# Patient Record
Sex: FEMALE | Race: BLACK OR AFRICAN AMERICAN | NOT HISPANIC OR LATINO | Employment: FULL TIME | URBAN - METROPOLITAN AREA
[De-identification: names, ages, dates, MRNs, and addresses within clinical notes are randomized per-mention and may not be internally consistent; named-entity substitution may affect disease eponyms.]

---

## 2022-01-17 ENCOUNTER — APPOINTMENT (OUTPATIENT)
Dept: CT IMAGING | Facility: HOSPITAL | Age: 22
End: 2022-01-17

## 2022-01-17 ENCOUNTER — HOSPITAL ENCOUNTER (EMERGENCY)
Facility: HOSPITAL | Age: 22
Discharge: HOME | End: 2022-01-17
Attending: EMERGENCY MEDICINE | Admitting: EMERGENCY MEDICINE

## 2022-01-17 VITALS
WEIGHT: 116.9 LBS | RESPIRATION RATE: 17 BRPM | TEMPERATURE: 97.5 F | DIASTOLIC BLOOD PRESSURE: 76 MMHG | BODY MASS INDEX: 19.47 KG/M2 | HEIGHT: 65 IN | SYSTOLIC BLOOD PRESSURE: 123 MMHG | OXYGEN SATURATION: 100 % | HEART RATE: 81 BPM

## 2022-01-17 DIAGNOSIS — R17 ELEVATED BILIRUBIN: ICD-10-CM

## 2022-01-17 DIAGNOSIS — R10.13 EPIGASTRIC ABDOMINAL PAIN: Primary | ICD-10-CM

## 2022-01-17 LAB
ALBUMIN SERPL-MCNC: 4.9 G/DL (ref 3.5–5.2)
ALBUMIN/GLOB SERPL: 1.9 G/DL
ALP SERPL-CCNC: 46 U/L (ref 39–117)
ALT SERPL W P-5'-P-CCNC: 19 U/L (ref 1–33)
ANION GAP SERPL CALCULATED.3IONS-SCNC: 15 MMOL/L (ref 5–15)
AST SERPL-CCNC: 16 U/L (ref 1–32)
BACTERIA UR QL AUTO: ABNORMAL /HPF
BASOPHILS # BLD AUTO: 0.04 10*3/MM3 (ref 0–0.2)
BASOPHILS NFR BLD AUTO: 0.9 % (ref 0–1.5)
BILIRUB SERPL-MCNC: 2.2 MG/DL (ref 0–1.2)
BILIRUB UR QL STRIP: NEGATIVE
BUN SERPL-MCNC: 9 MG/DL (ref 6–20)
BUN/CREAT SERPL: 9 (ref 7–25)
CALCIUM SPEC-SCNC: 9.7 MG/DL (ref 8.6–10.5)
CHLORIDE SERPL-SCNC: 103 MMOL/L (ref 98–107)
CLARITY UR: ABNORMAL
CO2 SERPL-SCNC: 21 MMOL/L (ref 22–29)
COLOR UR: YELLOW
CREAT SERPL-MCNC: 1 MG/DL (ref 0.57–1)
DEPRECATED RDW RBC AUTO: 39.4 FL (ref 37–54)
EOSINOPHIL # BLD AUTO: 0.04 10*3/MM3 (ref 0–0.4)
EOSINOPHIL NFR BLD AUTO: 0.9 % (ref 0.3–6.2)
ERYTHROCYTE [DISTWIDTH] IN BLOOD BY AUTOMATED COUNT: 13.2 % (ref 12.3–15.4)
GFR SERPL CREATININE-BSD FRML MDRD: 84 ML/MIN/1.73
GLOBULIN UR ELPH-MCNC: 2.6 GM/DL
GLUCOSE SERPL-MCNC: 81 MG/DL (ref 65–99)
GLUCOSE UR STRIP-MCNC: NEGATIVE MG/DL
HCG SERPL QL: NEGATIVE
HCT VFR BLD AUTO: 37.5 % (ref 34–46.6)
HGB BLD-MCNC: 12.4 G/DL (ref 12–15.9)
HGB UR QL STRIP.AUTO: ABNORMAL
HYALINE CASTS UR QL AUTO: ABNORMAL /LPF
IMM GRANULOCYTES # BLD AUTO: 0.01 10*3/MM3 (ref 0–0.05)
IMM GRANULOCYTES NFR BLD AUTO: 0.2 % (ref 0–0.5)
KETONES UR QL STRIP: ABNORMAL
LEUKOCYTE ESTERASE UR QL STRIP.AUTO: NEGATIVE
LIPASE SERPL-CCNC: 15 U/L (ref 13–60)
LYMPHOCYTES # BLD AUTO: 1.75 10*3/MM3 (ref 0.7–3.1)
LYMPHOCYTES NFR BLD AUTO: 39.5 % (ref 19.6–45.3)
MCH RBC QN AUTO: 27.4 PG (ref 26.6–33)
MCHC RBC AUTO-ENTMCNC: 33.1 G/DL (ref 31.5–35.7)
MCV RBC AUTO: 83 FL (ref 79–97)
MONOCYTES # BLD AUTO: 0.45 10*3/MM3 (ref 0.1–0.9)
MONOCYTES NFR BLD AUTO: 10.2 % (ref 5–12)
MUCOUS THREADS URNS QL MICRO: ABNORMAL /HPF
NEUTROPHILS NFR BLD AUTO: 2.14 10*3/MM3 (ref 1.7–7)
NEUTROPHILS NFR BLD AUTO: 48.3 % (ref 42.7–76)
NITRITE UR QL STRIP: NEGATIVE
NRBC BLD AUTO-RTO: 0 /100 WBC (ref 0–0.2)
PH UR STRIP.AUTO: 5.5 [PH] (ref 5–8)
PLATELET # BLD AUTO: 311 10*3/MM3 (ref 140–450)
PMV BLD AUTO: 10.6 FL (ref 6–12)
POTASSIUM SERPL-SCNC: 3.6 MMOL/L (ref 3.5–5.2)
PROT SERPL-MCNC: 7.5 G/DL (ref 6–8.5)
PROT UR QL STRIP: NEGATIVE
RBC # BLD AUTO: 4.52 10*6/MM3 (ref 3.77–5.28)
RBC # UR STRIP: ABNORMAL /HPF
REF LAB TEST METHOD: ABNORMAL
SODIUM SERPL-SCNC: 139 MMOL/L (ref 136–145)
SP GR UR STRIP: 1.02 (ref 1–1.03)
SQUAMOUS #/AREA URNS HPF: ABNORMAL /HPF
UROBILINOGEN UR QL STRIP: ABNORMAL
WBC # UR STRIP: ABNORMAL /HPF
WBC NRBC COR # BLD: 4.43 10*3/MM3 (ref 3.4–10.8)

## 2022-01-17 PROCEDURE — 83690 ASSAY OF LIPASE: CPT | Performed by: PHYSICIAN ASSISTANT

## 2022-01-17 PROCEDURE — 99283 EMERGENCY DEPT VISIT LOW MDM: CPT

## 2022-01-17 PROCEDURE — 80053 COMPREHEN METABOLIC PANEL: CPT | Performed by: PHYSICIAN ASSISTANT

## 2022-01-17 PROCEDURE — 84703 CHORIONIC GONADOTROPIN ASSAY: CPT | Performed by: PHYSICIAN ASSISTANT

## 2022-01-17 PROCEDURE — 81001 URINALYSIS AUTO W/SCOPE: CPT | Performed by: PHYSICIAN ASSISTANT

## 2022-01-17 PROCEDURE — 96374 THER/PROPH/DIAG INJ IV PUSH: CPT

## 2022-01-17 PROCEDURE — 85025 COMPLETE CBC W/AUTO DIFF WBC: CPT | Performed by: PHYSICIAN ASSISTANT

## 2022-01-17 PROCEDURE — 25010000002 IOPAMIDOL 61 % SOLUTION: Performed by: EMERGENCY MEDICINE

## 2022-01-17 PROCEDURE — 74177 CT ABD & PELVIS W/CONTRAST: CPT

## 2022-01-17 RX ORDER — SUCRALFATE 1 G/1
1 TABLET ORAL 4 TIMES DAILY
Qty: 20 TABLET | Refills: 0 | Status: ON HOLD | OUTPATIENT
Start: 2022-01-17 | End: 2022-05-13

## 2022-01-17 RX ORDER — FAMOTIDINE 10 MG/ML
20 INJECTION, SOLUTION INTRAVENOUS ONCE
Status: COMPLETED | OUTPATIENT
Start: 2022-01-17 | End: 2022-01-17

## 2022-01-17 RX ORDER — LIDOCAINE HYDROCHLORIDE 20 MG/ML
15 SOLUTION OROPHARYNGEAL ONCE
Status: COMPLETED | OUTPATIENT
Start: 2022-01-17 | End: 2022-01-17

## 2022-01-17 RX ORDER — SODIUM CHLORIDE 0.9 % (FLUSH) 0.9 %
10 SYRINGE (ML) INJECTION AS NEEDED
Status: DISCONTINUED | OUTPATIENT
Start: 2022-01-17 | End: 2022-01-17 | Stop reason: HOSPADM

## 2022-01-17 RX ORDER — ALUMINA, MAGNESIA, AND SIMETHICONE 2400; 2400; 240 MG/30ML; MG/30ML; MG/30ML
15 SUSPENSION ORAL ONCE
Status: COMPLETED | OUTPATIENT
Start: 2022-01-17 | End: 2022-01-17

## 2022-01-17 RX ADMIN — IOPAMIDOL 85 ML: 612 INJECTION, SOLUTION INTRAVENOUS at 12:26

## 2022-01-17 RX ADMIN — MAGNESIUM HYDROXIDE,ALUMINUM HYDROXICE,SIMETHICONE 15 ML: 240; 2400; 2400 SUSPENSION ORAL at 13:02

## 2022-01-17 RX ADMIN — FAMOTIDINE 20 MG: 10 INJECTION INTRAVENOUS at 11:37

## 2022-01-17 RX ADMIN — LIDOCAINE HYDROCHLORIDE 15 ML: 20 SOLUTION ORAL; TOPICAL at 13:02

## 2022-01-17 NOTE — ED PROVIDER NOTES
MD ATTESTATION NOTE    The VÍCTRO and I have discussed this patient's history, physical exam, and treatment plan.  I have reviewed the documentation and personally had a face to face interaction with the patient. I affirm the documentation and agree with the treatment and plan.  The attached note describes my personal findings.      I provided a substantive portion of the care of the patient.  I personally performed the physical exam in its entirety, and below are my findings.  For this patient encounter, the patient wore surgical mask, I wore full protective PPE including N95 and eye protection    General : Patient is awake alert and oriented  HEENT: NCAT  CV: Heart is regular with no murmurs  Respiratory: CTA bilaterally  Abd: Soft, mild tenderness in the left upper quadrant with no rebound or guarding.  It is nondistended.  Ext: No acute abnormalities  Skin: No rash  Neuro: Cranial nerves II through XII grossly intact as tested.  No acute lateralizing deficits.  Psych: Normal mood and affect    Patient presents with left upper quadrant abdominal pain this been ongoing now for quite a while.  It definitely gets worse whenever she eats, and sounds like acid reflux or gastritis.  Her vitals are stable, labs are unremarkable, CT scan shows nothing acute.  I think she should continue with the acid reducing medications, really work on a bland diet and other reflux precautions, and we will give her a GI follow-up to call for an appointment.         Rich Juarez MD  01/17/22 6656

## 2022-01-17 NOTE — ED NOTES
Pt wearing mask throughout encouter. This RN wearing mask and goggles throughout encounter.       Florencia Monahan, RN  01/17/22 1058

## 2022-01-17 NOTE — DISCHARGE INSTRUCTIONS
Although you are being discharged from the ED today, I encourage you to return for worsening symptoms. Things can, and do, change such that treatment at home with medication may not be adequate. Specifically I recommend returning for severe or intractable abdominal pain, intractable vomiting, high fever or any other worsening or alarming symptoms.     Rest.  Eat a bland diet.  Follow up with gastroenterology for further evaluation and management.  Call to make an appointment soon as possible.  Your bilirubin (liver test) was mildly elevated today.  Your GI doctor or PCP can follow-up with this.  Follow up with primary care provider for further management and to have blood pressure rechecked.  Take your medications as prescribed.

## 2022-01-17 NOTE — ED NOTES
Patient states she has had left sided abdominal pain for 4-5 months. Patient was recently dx with acid reflux at [facility] and given medication without any relief.          Isha Dave RN  01/17/22 1015

## 2022-01-17 NOTE — ED PROVIDER NOTES
EMERGENCY DEPARTMENT ENCOUNTER    Room Number:  02/02  Date seen:  1/17/2022  Time seen: 11:14 EST  PCP: Provider, No Known  Historian: patient      HPI:  Chief Complaint: abdominal pain    Context: Sandy Davidson is a 21 y.o. female who presents to the ED for evaluation of abdominal pain.  She states she has had epigastric abdominal pain since October of last year.  She states she went to an urgent care in November and was told it was likely acid reflux and was placed on omeprazole which the patient has taken with little to no relief.  She states over the past week the pain has become severe in nature.  She states it becomes much worse anytime after she eats.  She says she has had little to no appetite over the past couple months and has lost approximately 30 pounds.  She denies any fever, chills, vomiting, diarrhea.  She states she has had nausea at times.  She has not had any abdominal surgeries.  She states she is sexually active and uses condoms and oral contraceptives and does not believe she could be pregnant at this time.  She has never seen a GI doctor or had an endoscopy or upper GI.  She denies any black or bloody stools.  She denies any urinary symptoms or flank pain.  She states the pain is in the epigastric region and sometimes radiates to the left side of her abdomen.      PAST MEDICAL HISTORY  Active Ambulatory Problems     Diagnosis Date Noted   • No Active Ambulatory Problems     Resolved Ambulatory Problems     Diagnosis Date Noted   • No Resolved Ambulatory Problems     Past Medical History:   Diagnosis Date   • ADHD    • Anemia    • GERD (gastroesophageal reflux disease)    • Hyperlipidemia          PAST SURGICAL HISTORY  History reviewed. No pertinent surgical history.      FAMILY HISTORY  History reviewed. No pertinent family history.      SOCIAL HISTORY  Social History     Socioeconomic History   • Marital status: Single   Tobacco Use   • Smoking status: Former Smoker   • Smokeless tobacco:  Never Used   Substance and Sexual Activity   • Alcohol use: Yes     Comment: social    • Drug use: Never         ALLERGIES  Patient has no known allergies.        REVIEW OF SYSTEMS  Review of Systems   Constitutional: Negative for chills and fever.   HENT: Negative for sore throat.    Respiratory: Negative for cough and shortness of breath.    Cardiovascular: Negative for chest pain.   Gastrointestinal: Positive for abdominal pain and nausea. Negative for diarrhea and vomiting.   Genitourinary: Negative for dysuria, flank pain and vaginal discharge.   Musculoskeletal: Negative for back pain.   Neurological: Negative for light-headedness.   All other systems reviewed and are negative.     All systems reviewed and negative except for those discussed in HPI.       PHYSICAL EXAM  ED Triage Vitals   Temp Heart Rate Resp BP SpO2   01/17/22 1013 01/17/22 1013 01/17/22 1013 01/17/22 1038 01/17/22 1013   97.5 °F (36.4 °C) 119 18 125/84 95 %      Temp src Heart Rate Source Patient Position BP Location FiO2 (%)   -- -- -- -- --                GENERAL: not distressed  HENT: atraumatic  EYES: no scleral icterus  CV: regular rhythm, regular rate  RESPIRATORY: normal effort  ABDOMEN: soft, tender in the epigastric and left upper quadrant with mild voluntary guarding.  No palpable masses.  Normal bowel sounds.  No distention.  No CVA tenderness bilaterally.  MUSCULOSKELETAL: no deformity  NEURO: alert, moves all extremities, follows commands  SKIN: warm, dry    Vital signs and nursing notes reviewed.          LAB RESULTS  Recent Results (from the past 24 hour(s))   Urinalysis With Microscopic If Indicated (No Culture) - Urine, Clean Catch    Collection Time: 01/17/22 10:40 AM    Specimen: Urine, Clean Catch   Result Value Ref Range    Color, UA Yellow Yellow, Straw    Appearance, UA Slightly Cloudy (A) Clear    pH, UA 5.5 5.0 - 8.0    Specific Gravity, UA 1.025 1.005 - 1.030    Glucose, UA Negative Negative    Ketones, UA 15 mg/dL  (1+) (A) Negative    Bilirubin, UA Negative Negative    Blood, UA Moderate (2+) (A) Negative    Protein, UA Negative Negative    Leuk Esterase, UA Negative Negative    Nitrite, UA Negative Negative    Urobilinogen, UA 0.2 E.U./dL 0.2 - 1.0 E.U./dL   Urinalysis, Microscopic Only - Urine, Clean Catch    Collection Time: 01/17/22 10:40 AM    Specimen: Urine, Clean Catch   Result Value Ref Range    RBC, UA 0-2 None Seen, 0-2 /HPF    WBC, UA 6-12 (A) None Seen, 0-2 /HPF    Bacteria, UA 1+ (A) None Seen /HPF    Squamous Epithelial Cells, UA 7-12 (A) None Seen, 0-2 /HPF    Hyaline Casts, UA 0-2 None Seen /LPF    Mucus, UA Trace None Seen, Trace /HPF    Methodology Manual Light Microscopy    Comprehensive Metabolic Panel    Collection Time: 01/17/22 10:45 AM    Specimen: Blood   Result Value Ref Range    Glucose 81 65 - 99 mg/dL    BUN 9 6 - 20 mg/dL    Creatinine 1.00 0.57 - 1.00 mg/dL    Sodium 139 136 - 145 mmol/L    Potassium 3.6 3.5 - 5.2 mmol/L    Chloride 103 98 - 107 mmol/L    CO2 21.0 (L) 22.0 - 29.0 mmol/L    Calcium 9.7 8.6 - 10.5 mg/dL    Total Protein 7.5 6.0 - 8.5 g/dL    Albumin 4.90 3.50 - 5.20 g/dL    ALT (SGPT) 19 1 - 33 U/L    AST (SGOT) 16 1 - 32 U/L    Alkaline Phosphatase 46 39 - 117 U/L    Total Bilirubin 2.2 (H) 0.0 - 1.2 mg/dL    eGFR  African Amer 84 >60 mL/min/1.73    Globulin 2.6 gm/dL    A/G Ratio 1.9 g/dL    BUN/Creatinine Ratio 9.0 7.0 - 25.0    Anion Gap 15.0 5.0 - 15.0 mmol/L   hCG, Serum, Qualitative    Collection Time: 01/17/22 10:45 AM    Specimen: Blood   Result Value Ref Range    HCG Qualitative Negative Negative   CBC Auto Differential    Collection Time: 01/17/22 10:45 AM    Specimen: Blood   Result Value Ref Range    WBC 4.43 3.40 - 10.80 10*3/mm3    RBC 4.52 3.77 - 5.28 10*6/mm3    Hemoglobin 12.4 12.0 - 15.9 g/dL    Hematocrit 37.5 34.0 - 46.6 %    MCV 83.0 79.0 - 97.0 fL    MCH 27.4 26.6 - 33.0 pg    MCHC 33.1 31.5 - 35.7 g/dL    RDW 13.2 12.3 - 15.4 %    RDW-SD 39.4 37.0 - 54.0  fl    MPV 10.6 6.0 - 12.0 fL    Platelets 311 140 - 450 10*3/mm3    Neutrophil % 48.3 42.7 - 76.0 %    Lymphocyte % 39.5 19.6 - 45.3 %    Monocyte % 10.2 5.0 - 12.0 %    Eosinophil % 0.9 0.3 - 6.2 %    Basophil % 0.9 0.0 - 1.5 %    Immature Grans % 0.2 0.0 - 0.5 %    Neutrophils, Absolute 2.14 1.70 - 7.00 10*3/mm3    Lymphocytes, Absolute 1.75 0.70 - 3.10 10*3/mm3    Monocytes, Absolute 0.45 0.10 - 0.90 10*3/mm3    Eosinophils, Absolute 0.04 0.00 - 0.40 10*3/mm3    Basophils, Absolute 0.04 0.00 - 0.20 10*3/mm3    Immature Grans, Absolute 0.01 0.00 - 0.05 10*3/mm3    nRBC 0.0 0.0 - 0.2 /100 WBC   Lipase    Collection Time: 01/17/22 10:45 AM    Specimen: Blood   Result Value Ref Range    Lipase 15 13 - 60 U/L       Ordered the above labs and independently reviewed the results.        RADIOLOGY  CT Abdomen Pelvis With Contrast    Result Date: 1/17/2022  Narrative: CT ABDOMEN PELVIS W CONTRAST-  CLINICAL HISTORY: Epigastric pain. Unprovoked weight loss.  TECHNIQUE: Spiral CT images were acquired through the abdomen and pelvis with IV contrast only and were reconstructed in 3 mm thick slices.  Radiation dose reduction techniques were utilized, including automated exposure control and exposure modulation based on body size.  COMPARISON: None  FINDINGS: There is focal fatty infiltration in the liver near the falciform ligament. The liver is otherwise unremarkable. The spleen and pancreas and kidneys and adrenal glands appear within normal limits. The gallbladder is unremarkable. There is no bile duct dilatation. The stomach and small and large bowel appear grossly normal. No bowel dilatation is identified. No abnormal masses or fluid collections are identified in the abdomen or pelvis. The uterus and ovaries are unremarkable.      Impression: Normal CT scan of the abdomen and pelvis.  This report was finalized on 1/17/2022 12:47 PM by Dr. Usman Harper M.D.        I ordered the above noted radiological studies. Reviewed  by me and discussed with radiologist.  See dictation for official radiology interpretation.      MEDICATIONS GIVEN IN ER  Medications   sodium chloride 0.9 % flush 10 mL (has no administration in time range)   famotidine (PEPCID) injection 20 mg (20 mg Intravenous Given 1/17/22 1137)   iopamidol (ISOVUE-300) 61 % injection 100 mL (85 mL Intravenous Given by Other 1/17/22 1226)   aluminum-magnesium hydroxide-simethicone (MAALOX MAX) 400-400-40 MG/5ML suspension 15 mL (15 mL Oral Given 1/17/22 1302)   Lidocaine Viscous HCl (XYLOCAINE) 2 % solution 15 mL (15 mL Mouth/Throat Given 1/17/22 1302)       MEDICAL RECORD REVIEW  I reviewed the patient's records      PROGRESS, DATA ANALYSIS, CONSULTS, AND MEDICAL DECISION MAKING    All labs have been independently reviewed by me.  All radiology studies have been reviewed by me. Discussion below represents my analysis of pertinent findings related to patient's condition, differential diagnosis, treatment plan and final disposition.    DDX includes but not limited to gastritis, reflux, colitis, cholecystitis, pancreatitis, UTI, abdominal mass.      ED Course as of 01/17/22 1319   Mon Jan 17, 2022   1247 Discussed the patient with the radiologist.  He states CT scan is negative for acute findings. [AH]   1312 Patient just completed her GI cocktail and does feel some relief.  I informed her of lab and imaging findings.  I think patient needs to see a GI doctor for possible upper GI/endoscopy and further evaluation.  Meanwhile I will start her on Carafate.  We gave her strict return precautions for severe abdominal pain, intractable vomiting, or high fever. [AH]   1313 Total Bilirubin(!): 2.2 []      ED Course User Index  [AH] Jessenia Li PA           Reviewed pt's history and workup with Dr. Juarez.  After a bedside evaluation; they agree with the plan of care      Patient's disposition is discharge home.     Patient was placed in face mask in first look. Patient was wearing  facemask each time I entered the room and throughout our encounter. I wore full protective equipment throughout this patient encounter including a face mask, eye shield, gown and gloves. Hand hygiene was performed before donning protective equipment and after removal when leaving the room.        DIAGNOSIS  Final diagnoses:   Epigastric abdominal pain   Elevated bilirubin           Latest Documented Vital Signs:  As of 13:19 EST  BP- 125/84 HR- 119 Temp- 97.5 °F (36.4 °C) O2 sat- 95%         Jessenia Li PA  01/17/22 1320

## 2022-02-10 ENCOUNTER — TELEPHONE (OUTPATIENT)
Dept: GASTROENTEROLOGY | Facility: CLINIC | Age: 22
End: 2022-02-10

## 2022-02-10 ENCOUNTER — OFFICE VISIT (OUTPATIENT)
Dept: GASTROENTEROLOGY | Facility: CLINIC | Age: 22
End: 2022-02-10

## 2022-02-10 VITALS — WEIGHT: 119.2 LBS | BODY MASS INDEX: 19.86 KG/M2 | TEMPERATURE: 97.5 F | HEIGHT: 65 IN

## 2022-02-10 DIAGNOSIS — R10.13 EPIGASTRIC PAIN: Primary | ICD-10-CM

## 2022-02-10 DIAGNOSIS — A04.8 POSITIVE H. PYLORI TEST: ICD-10-CM

## 2022-02-10 DIAGNOSIS — R63.4 WEIGHT LOSS: ICD-10-CM

## 2022-02-10 PROCEDURE — 99203 OFFICE O/P NEW LOW 30 MIN: CPT | Performed by: INTERNAL MEDICINE

## 2022-02-10 RX ORDER — LANSOPRAZOLE 30 MG/1
CAPSULE, DELAYED RELEASE ORAL
COMMUNITY
Start: 2022-01-29 | End: 2022-02-11 | Stop reason: DRUGHIGH

## 2022-02-10 RX ORDER — NORETHINDRONE ACETATE AND ETHINYL ESTRADIOL AND FERROUS FUMARATE 5-7-9-7
KIT ORAL DAILY
COMMUNITY
End: 2022-11-22 | Stop reason: SDUPTHER

## 2022-02-10 RX ORDER — LANSOPRAZOLE 30 MG/1
CAPSULE, DELAYED RELEASE ORAL
Qty: 42 CAPSULE | OUTPATIENT
Start: 2022-02-10

## 2022-02-10 RX ORDER — CLARITHROMYCIN 500 MG/1
TABLET, COATED ORAL
Status: ON HOLD | COMMUNITY
Start: 2022-01-29 | End: 2022-05-13

## 2022-02-10 RX ORDER — SODIUM CHLORIDE, SODIUM LACTATE, POTASSIUM CHLORIDE, CALCIUM CHLORIDE 600; 310; 30; 20 MG/100ML; MG/100ML; MG/100ML; MG/100ML
30 INJECTION, SOLUTION INTRAVENOUS CONTINUOUS
Status: CANCELLED | OUTPATIENT
Start: 2022-05-13

## 2022-02-10 RX ORDER — AMOXICILLIN 875 MG/1
TABLET, COATED ORAL
Status: ON HOLD | COMMUNITY
Start: 2022-01-29 | End: 2022-05-13

## 2022-02-10 NOTE — PROGRESS NOTES
Chief Complaint   Patient presents with   • Hospital f/up-ABD pain   • Much nausea w/eatting-weight loss        Sandy Davidson is a  21 y.o. female here for an initial visit for epigastric pain, weight loss, positive helicobacter pylori    HPI this 21-year-old -American female patient of FERNANDO Kang presents with a 5-month history of epigastric pain with associated nausea and weight loss.  She had been seen at the [facility] in November and in the emergency room in January.  Recent testing for Helicobacter pylori was positive and she has almost completed a Prevpac regimen with 2 days left.  She notes some improvement but not resolution of her symptoms.  She has had a weight loss of approximately 25 pounds over this timeframe.  She denies any exposure history.  Family history is negative for peptic ulcer disease.  The stomach pain is described as a burning as well as sharp quality.  We discussed further assessment and will schedule upper endoscopy.    Past Medical History:   Diagnosis Date   • ADHD    • Anemia    • GERD (gastroesophageal reflux disease)    • H. pylori infection 01/25/2022   • Hyperlipidemia        Current Outpatient Medications   Medication Sig Dispense Refill   • amoxicillin (AMOXIL) 875 MG tablet      • clarithromycin (BIAXIN) 500 MG tablet      • lansoprazole (PREVACID) 30 MG capsule TAKE 1 CAPSULE BY MOUTH THREE TIMES DAILY FOR 14 DAYS     • norethindrone-ethinyl estradiol-iron (ESTROSTEP FE) 1-20/1-30/1-35 MG-MCG tablet Take  by mouth Daily.     • lisdexamfetamine (VYVANSE) 50 MG capsule Take 50 mg by mouth Daily     • sucralfate (CARAFATE) 1 g tablet Take 1 tablet by mouth 4 (Four) Times a Day. before meals 20 tablet 0     No current facility-administered medications for this visit.       PRN Meds:.    No Known Allergies    Social History     Socioeconomic History   • Marital status: Single   Tobacco Use   • Smoking status: Former Smoker   • Smokeless tobacco:  Never Used   Substance and Sexual Activity   • Alcohol use: Yes     Comment: social    • Drug use: Never       No family history on file.    Review of Systems   Constitutional: Positive for unexpected weight change. Negative for activity change, appetite change and fatigue.   HENT: Negative for congestion, facial swelling, sore throat, trouble swallowing and voice change.    Eyes: Negative for photophobia and visual disturbance.   Respiratory: Negative for cough and choking.    Cardiovascular: Negative for chest pain.   Gastrointestinal: Positive for abdominal pain and nausea. Negative for abdominal distention, anal bleeding, blood in stool, constipation, diarrhea, rectal pain and vomiting.   Endocrine: Negative for polyphagia.   Musculoskeletal: Negative for arthralgias, gait problem and joint swelling.   Skin: Negative for color change, pallor and rash.   Allergic/Immunologic: Negative for food allergies.   Neurological: Negative for speech difficulty and headaches.   Hematological: Does not bruise/bleed easily.   Psychiatric/Behavioral: Negative for agitation, confusion and sleep disturbance.       Vitals:    02/10/22 1409   Temp: 97.5 °F (36.4 °C)       Physical Exam  Vitals reviewed.   Constitutional:       General: She is not in acute distress.     Appearance: She is well-developed. She is not diaphoretic.   HENT:      Head: Normocephalic.      Mouth/Throat:      Pharynx: No oropharyngeal exudate.   Eyes:      General: No scleral icterus.     Conjunctiva/sclera: Conjunctivae normal.   Neck:      Thyroid: No thyromegaly.   Cardiovascular:      Rate and Rhythm: Normal rate and regular rhythm.      Heart sounds: No murmur heard.      Pulmonary:      Effort: No respiratory distress.      Breath sounds: Normal breath sounds. No wheezing or rales.   Abdominal:      General: Bowel sounds are normal. There is no distension.      Palpations: Abdomen is soft. There is no mass.      Tenderness: There is no abdominal  tenderness.   Musculoskeletal:         General: No tenderness. Normal range of motion.      Cervical back: Normal range of motion.   Lymphadenopathy:      Cervical: No cervical adenopathy.   Skin:     General: Skin is warm and dry.      Findings: No erythema or rash.   Neurological:      Mental Status: She is alert and oriented to person, place, and time.   Psychiatric:         Behavior: Behavior normal.         ASSESSMENT  #1 epigastric pain  #2 weight loss  #3 history of positive H. Pylori      PLAN  Schedule EGD  Complete Prevpac therapy and then maintain Prevacid 30 mg daily  Continue Carafate therapy      ICD-10-CM ICD-9-CM   1. Epigastric pain  R10.13 789.06   2. Weight loss  R63.4 783.21   3. Positive H. pylori test  A04.8 041.86

## 2022-02-10 NOTE — TELEPHONE ENCOUNTER
spoke with pt in office scheduled at [facility] on may 13 arrive at 0930 am basil triplett---handed pt egd instructions  in office

## 2022-02-11 ENCOUNTER — TELEPHONE (OUTPATIENT)
Dept: GASTROENTEROLOGY | Facility: CLINIC | Age: 22
End: 2022-02-11

## 2022-02-11 RX ORDER — LANSOPRAZOLE 30 MG/1
30 CAPSULE, DELAYED RELEASE ORAL DAILY
Qty: 30 CAPSULE | Refills: 5 | Status: SHIPPED | OUTPATIENT
Start: 2022-02-11 | End: 2022-06-08 | Stop reason: SDUPTHER

## 2022-02-11 NOTE — TELEPHONE ENCOUNTER
Call to pt.  Advise per DR Prince note.  Verb understanding.     States needs prevacid 30 mg 1 tab po daily refill.  See o/v note of 2/10.      Escribe completed for prevacid as requested, #30, R5.

## 2022-02-11 NOTE — TELEPHONE ENCOUNTER
Okay to advance diet slowly as tolerated but if symptoms progress or worsen she may need to resume bland diet.  Plans for endoscopic evaluation in the future.

## 2022-02-11 NOTE — TELEPHONE ENCOUNTER
----- Message from Sophie Lyman sent at 2/10/2022  3:14 PM EST -----  Regarding: diet  Contact: [phone]  Pt wants to know how long she has to stay on a bland diet.

## 2022-05-10 PROCEDURE — 87635 SARS-COV-2 COVID-19 AMP PRB: CPT | Performed by: NURSE PRACTITIONER

## 2022-05-13 ENCOUNTER — HOSPITAL ENCOUNTER (OUTPATIENT)
Facility: HOSPITAL | Age: 22
Setting detail: HOSPITAL OUTPATIENT SURGERY
Discharge: HOME | End: 2022-05-13
Attending: INTERNAL MEDICINE | Admitting: INTERNAL MEDICINE

## 2022-05-13 ENCOUNTER — ANESTHESIA (OUTPATIENT)
Dept: GASTROENTEROLOGY | Facility: HOSPITAL | Age: 22
End: 2022-05-13

## 2022-05-13 ENCOUNTER — ANESTHESIA EVENT (OUTPATIENT)
Dept: GASTROENTEROLOGY | Facility: HOSPITAL | Age: 22
End: 2022-05-13

## 2022-05-13 VITALS
WEIGHT: 123.4 LBS | TEMPERATURE: 98 F | DIASTOLIC BLOOD PRESSURE: 69 MMHG | RESPIRATION RATE: 18 BRPM | HEIGHT: 66 IN | HEART RATE: 72 BPM | OXYGEN SATURATION: 100 % | BODY MASS INDEX: 19.83 KG/M2 | SYSTOLIC BLOOD PRESSURE: 102 MMHG

## 2022-05-13 DIAGNOSIS — R10.13 EPIGASTRIC PAIN: ICD-10-CM

## 2022-05-13 DIAGNOSIS — A04.8 POSITIVE H. PYLORI TEST: ICD-10-CM

## 2022-05-13 DIAGNOSIS — R63.4 WEIGHT LOSS: ICD-10-CM

## 2022-05-13 LAB
B-HCG UR QL: NEGATIVE
EXPIRATION DATE: NORMAL
INTERNAL NEGATIVE CONTROL: NEGATIVE
INTERNAL POSITIVE CONTROL: POSITIVE
Lab: NORMAL

## 2022-05-13 PROCEDURE — 25010000002 PROPOFOL 10 MG/ML EMULSION: Performed by: ANESTHESIOLOGY

## 2022-05-13 PROCEDURE — 87081 CULTURE SCREEN ONLY: CPT | Performed by: INTERNAL MEDICINE

## 2022-05-13 PROCEDURE — 43239 EGD BIOPSY SINGLE/MULTIPLE: CPT | Performed by: INTERNAL MEDICINE

## 2022-05-13 PROCEDURE — 88342 IMHCHEM/IMCYTCHM 1ST ANTB: CPT | Performed by: INTERNAL MEDICINE

## 2022-05-13 PROCEDURE — S0260 H&P FOR SURGERY: HCPCS | Performed by: INTERNAL MEDICINE

## 2022-05-13 PROCEDURE — 88305 TISSUE EXAM BY PATHOLOGIST: CPT | Performed by: INTERNAL MEDICINE

## 2022-05-13 PROCEDURE — 81025 URINE PREGNANCY TEST: CPT | Performed by: INTERNAL MEDICINE

## 2022-05-13 RX ORDER — LIDOCAINE HYDROCHLORIDE 20 MG/ML
INJECTION, SOLUTION INFILTRATION; PERINEURAL AS NEEDED
Status: DISCONTINUED | OUTPATIENT
Start: 2022-05-13 | End: 2022-05-13 | Stop reason: SURG

## 2022-05-13 RX ORDER — SODIUM CHLORIDE, SODIUM LACTATE, POTASSIUM CHLORIDE, CALCIUM CHLORIDE 600; 310; 30; 20 MG/100ML; MG/100ML; MG/100ML; MG/100ML
30 INJECTION, SOLUTION INTRAVENOUS CONTINUOUS
Status: DISCONTINUED | OUTPATIENT
Start: 2022-05-13 | End: 2022-05-13 | Stop reason: HOSPADM

## 2022-05-13 RX ORDER — SODIUM CHLORIDE 0.9 % (FLUSH) 0.9 %
10 SYRINGE (ML) INJECTION AS NEEDED
Status: DISCONTINUED | OUTPATIENT
Start: 2022-05-13 | End: 2022-05-13 | Stop reason: HOSPADM

## 2022-05-13 RX ORDER — LIDOCAINE HYDROCHLORIDE 10 MG/ML
0.5 INJECTION, SOLUTION INFILTRATION; PERINEURAL ONCE AS NEEDED
Status: DISCONTINUED | OUTPATIENT
Start: 2022-05-13 | End: 2022-05-13 | Stop reason: HOSPADM

## 2022-05-13 RX ORDER — PROPOFOL 10 MG/ML
VIAL (ML) INTRAVENOUS AS NEEDED
Status: DISCONTINUED | OUTPATIENT
Start: 2022-05-13 | End: 2022-05-13 | Stop reason: SURG

## 2022-05-13 RX ORDER — PROPOFOL 10 MG/ML
VIAL (ML) INTRAVENOUS CONTINUOUS PRN
Status: DISCONTINUED | OUTPATIENT
Start: 2022-05-13 | End: 2022-05-13 | Stop reason: SURG

## 2022-05-13 RX ADMIN — PROPOFOL 150 MG: 10 INJECTION, EMULSION INTRAVENOUS at 10:47

## 2022-05-13 RX ADMIN — SODIUM CHLORIDE, POTASSIUM CHLORIDE, SODIUM LACTATE AND CALCIUM CHLORIDE 30 ML/HR: 600; 310; 30; 20 INJECTION, SOLUTION INTRAVENOUS at 10:39

## 2022-05-13 RX ADMIN — Medication 200 MCG/KG/MIN: at 10:47

## 2022-05-13 RX ADMIN — PROPOFOL 30 MG: 10 INJECTION, EMULSION INTRAVENOUS at 10:49

## 2022-05-13 RX ADMIN — LIDOCAINE HYDROCHLORIDE 60 MG: 20 INJECTION, SOLUTION INFILTRATION; PERINEURAL at 10:47

## 2022-05-13 NOTE — H&P
[facility]  Pre Procedure History & Physical    Chief Complaint:   Nausea, epigastric pain, weight loss    Subjective     HPI:   This 21-year-old female presents the endoscopy suite for upper endoscopic evaluation.  She had issues with nausea as well as epigastric pain and weight loss.    Past Medical History:   Past Medical History:   Diagnosis Date   • ADHD    • Anemia    • GERD (gastroesophageal reflux disease)    • H. pylori infection 01/25/2022   • Hyperlipidemia        Past Surgical History:  No past surgical history on file.    Family History:  No family history on file.    Social History:   reports that she has quit smoking. She has never used smokeless tobacco. She reports current alcohol use. She reports that she does not use drugs.    Medications:   No medications prior to admission.       Allergies:  Patient has no known allergies.    ROS:    Pertinent items are noted in HPI, all other systems reviewed and negative     Objective     There were no vitals taken for this visit.    Physical Exam   Constitutional: Pt is oriented to person, place, and time and well-developed, well-nourished, and in no distress.   Mouth/Throat: Oropharynx is clear and moist.   Neck: Normal range of motion.   Cardiovascular: Normal rate, regular rhythm and normal heart sounds.    Pulmonary/Chest: Effort normal and breath sounds normal.   Abdominal: Soft. Nontender  Skin: Skin is warm and dry.   Psychiatric: Mood, memory, affect and judgment normal.     Assessment & Plan     Diagnosis:  Nausea  Epigastric pain  Weight loss    Anticipated Surgical Procedure:  EGD    The risks, benefits, and alternatives of this procedure have been discussed with the patient or the responsible party- the patient understands and agrees to proceed.

## 2022-05-13 NOTE — ANESTHESIA POSTPROCEDURE EVALUATION
Patient: Sandy Davidson    Procedure Summary     Date: 05/13/22 Room / Location:  [facility]    Anesthesia Start: 1042 Anesthesia Stop: 1059    Procedure: ESOPHAGOGASTRODUODENOSCOPY with biopsies (N/A Esophagus) Diagnosis:       Epigastric pain      Weight loss      Positive H. pylori test      (Epigastric pain [R10.13])      (Weight loss [R63.4])      (Positive H. pylori test [A04.8])    Surgeons: Lawrence Prince MD Provider: Allan Salgado MD    Anesthesia Type: MAC ASA Status: 2          Anesthesia Type: MAC    Vitals  Vitals Value Taken Time   BP 96/56 05/13/22 1058   Temp     Pulse 64 05/13/22 1058   Resp 14 05/13/22 1058   SpO2 100 % 05/13/22 1058           Post Anesthesia Care and Evaluation    Patient location during evaluation: PHASE II  Patient participation: complete - patient participated  Level of consciousness: awake and alert  Pain management: adequate  Airway patency: patent  Anesthetic complications: No anesthetic complications  PONV Status: none  Cardiovascular status: acceptable and hemodynamically stable  Respiratory status: nonlabored ventilation, acceptable and spontaneous ventilation  Hydration status: acceptable

## 2022-05-13 NOTE — ANESTHESIA PREPROCEDURE EVALUATION
Anesthesia Evaluation     Patient summary reviewed and Nursing notes reviewed                Airway   Mallampati: II  TM distance: >3 FB  Neck ROM: full  No difficulty expected  Dental - normal exam     Pulmonary - normal exam   (+) a smoker Former,   Cardiovascular - normal exam    (+) hyperlipidemia,       Neuro/Psych  (+) psychiatric history ADHD,    GI/Hepatic/Renal/Endo    (+)  GERD,      Musculoskeletal     Abdominal  - normal exam   Substance History      OB/GYN          Other                        Anesthesia Plan    ASA 2     MAC     intravenous induction     Anesthetic plan, all risks, benefits, and alternatives have been provided, discussed and informed consent has been obtained with: patient.        CODE STATUS:

## 2022-05-14 LAB — UREASE TISS QL: NEGATIVE

## 2022-05-17 LAB
LAB AP CASE REPORT: NORMAL
LAB AP SPECIAL STAINS: NORMAL
PATH REPORT.FINAL DX SPEC: NORMAL
PATH REPORT.GROSS SPEC: NORMAL

## 2022-05-27 ENCOUNTER — TELEPHONE (OUTPATIENT)
Dept: GASTROENTEROLOGY | Facility: CLINIC | Age: 22
End: 2022-05-27

## 2022-05-27 NOTE — TELEPHONE ENCOUNTER
----- Message from Lawrence ROWE MD sent at 5/18/2022  5:22 PM EDT -----  Regarding: Biopsy results  Okay to call results, can continue PPI.  If weight loss persists may need small bowel work-up to include celiac panel, bile acid assay, and hydrogen breath testing.  ----- Message -----  From: Lab, Background User  Sent: 5/14/2022   9:37 PM EDT  To: Lawrence ROWE MD

## 2022-06-06 ENCOUNTER — TELEPHONE (OUTPATIENT)
Dept: GASTROENTEROLOGY | Facility: CLINIC | Age: 22
End: 2022-06-06

## 2022-06-06 NOTE — TELEPHONE ENCOUNTER
----- Message from Sandy Davidson sent at 6/3/2022  5:07 PM EDT -----  Regarding: test result   Hello,   I had a EGD done on 5/13/2022 and i haven't heard back with the results. I called the number [phone] and she was trying to transfer me to the correct office. She let me know you called on the 27th adn left a voicemail but i did not recieve that vociemail. I also work for [employer] in [place], [place] and we are on central time and i work 8:30-5 monday-friday. If it is possible, would you be able to call me during my lunch so i know i will be available then. Typically I have lunch between 12-12:30.     Thank you!

## 2022-06-08 RX ORDER — LANSOPRAZOLE 30 MG/1
30 CAPSULE, DELAYED RELEASE ORAL DAILY
Qty: 30 CAPSULE | Refills: 5 | Status: SHIPPED | OUTPATIENT
Start: 2022-06-08 | End: 2023-02-06

## 2022-07-11 PROCEDURE — 87635 SARS-COV-2 COVID-19 AMP PRB: CPT | Performed by: NURSE PRACTITIONER

## 2022-10-05 PROCEDURE — 87635 SARS-COV-2 COVID-19 AMP PRB: CPT | Performed by: NURSE PRACTITIONER

## 2022-10-23 ENCOUNTER — APPOINTMENT (OUTPATIENT)
Dept: GENERAL RADIOLOGY | Facility: HOSPITAL | Age: 22
End: 2022-10-23

## 2022-10-23 ENCOUNTER — HOSPITAL ENCOUNTER (EMERGENCY)
Facility: HOSPITAL | Age: 22
Discharge: HOME | End: 2022-10-23
Attending: FAMILY MEDICINE | Admitting: FAMILY MEDICINE

## 2022-10-23 VITALS
HEART RATE: 79 BPM | DIASTOLIC BLOOD PRESSURE: 80 MMHG | RESPIRATION RATE: 18 BRPM | SYSTOLIC BLOOD PRESSURE: 123 MMHG | OXYGEN SATURATION: 98 % | BODY MASS INDEX: 21.16 KG/M2 | HEIGHT: 65 IN | TEMPERATURE: 98.7 F | WEIGHT: 127 LBS
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
## 2022-10-23 PROCEDURE — 73660 X-RAY EXAM OF TOE(S): CPT

## 2022-10-23 PROCEDURE — 99282 EMERGENCY DEPT VISIT SF MDM: CPT

## 2022-10-23 RX ORDER — LIDOCAINE HYDROCHLORIDE 10 MG/ML
10 INJECTION, SOLUTION EPIDURAL; INFILTRATION; INTRACAUDAL; PERINEURAL ONCE
Status: COMPLETED | OUTPATIENT
Start: 2022-10-23 | End: 2022-10-23

## 2022-10-23 RX ADMIN — LIDOCAINE HYDROCHLORIDE 10 ML: 10 INJECTION, SOLUTION EPIDURAL; INFILTRATION; INTRACAUDAL; PERINEURAL at 03:00

## 2022-11-11 NOTE — PROGRESS NOTES
Enter Query Response Below      Query Response:     done         If applicable, please update the problem list.    Patient: Sandy Davidson        : 2000  Account: [id number]           Admit Date: 10/23/2022      Please update your ED Provider Note with the answer to the following query:        Jazmin Vasquez              Date: 2022     Documentation within the record reflects a procedure was performed.      Please verify and addend your provider note if any of the following apply:     Nail procedure performed or  Procedure not performed     If you have questions about this query, please contact me at [phone]    Sincerely,  [facility] Coding Department

## 2022-11-22 ENCOUNTER — LAB (OUTPATIENT)
Dept: LAB | Facility: OTHER | Age: 22
End: 2022-11-22

## 2022-11-22 ENCOUNTER — OFFICE VISIT (OUTPATIENT)
Dept: OBSTETRICS AND GYNECOLOGY | Facility: CLINIC | Age: 22
End: 2022-11-22

## 2022-11-22 VITALS
SYSTOLIC BLOOD PRESSURE: 108 MMHG | WEIGHT: 129 LBS | DIASTOLIC BLOOD PRESSURE: 70 MMHG | HEART RATE: 80 BPM | HEIGHT: 65 IN | BODY MASS INDEX: 21.49 KG/M2

## 2022-11-22 DIAGNOSIS — Z11.3 ROUTINE SCREENING FOR STI (SEXUALLY TRANSMITTED INFECTION): ICD-10-CM

## 2022-11-22 DIAGNOSIS — Z01.419 ENCOUNTER FOR GYNECOLOGICAL EXAMINATION WITH PAPANICOLAOU SMEAR OF CERVIX: Primary | ICD-10-CM

## 2022-11-22 DIAGNOSIS — Z30.41 ORAL CONTRACEPTIVE PILL SURVEILLANCE: ICD-10-CM

## 2022-11-22 PROCEDURE — 99395 PREV VISIT EST AGE 18-39: CPT | Performed by: NURSE PRACTITIONER

## 2022-11-22 RX ORDER — NORETHINDRONE ACETATE AND ETHINYL ESTRADIOL AND FERROUS FUMARATE 5-7-9-7
1 KIT ORAL DAILY
Qty: 28 TABLET | Refills: 12 | Status: SHIPPED | OUTPATIENT
Start: 2022-11-22

## 2022-11-22 NOTE — PROGRESS NOTES
Subjective   Chief Complaint   Patient presents with   • Gynecologic Exam     WWE     • Contraception     Refill OCP       Sandy Davidson is a 22 y.o. year old  presenting to be seen for her annual exam.  Today she has no concerns. Needs OCP refilled. Has been without it for 1 month but using condoms faithfully.     Patient's last menstrual period was 10/26/2022 (approximate).    OB History        0    Para   0    Term   0       0    AB   0    Living   0       SAB   0    IAB   0    Ectopic   0    Molar   0    Multiple   0    Live Births   0                Current birth control method: OCP (estrogen/progesterone) and condoms.    She is sexually active.  In the past 12 months there has not been new sexual partners.  Condoms ARE typically used.      Past 6 month menstrual history:    Cycle Frequency: regular, predictable and consistent every 28 - 32 days   Menstrual cycle character: flow is typically normal   Cycle Duration: 6 - 7   Number of heavy days of flows: 0   Dysmenorrhea: moderate and is not affecting her activities of daily living   PMS: is not affecting her activities of daily living   Intermenstrual bleeding present: no   Post-coital bleeding present: no     She exercises regularly: yes.  She wears her seat belt:yes.  She has concerns about domestic violence: no.  Last colonoscopy: Never  Last DEXA: Never  Last MMG: Never  Last pap:  2021 in [facility]  History of abnormal PAP: No    The following portions of the patient's history were reviewed and updated as appropriate:problem list, current medications, allergies, past family history, past medical history, past social history and past surgical history.    Social History    Tobacco Use      Smoking status: Former      Smokeless tobacco: Never      Tobacco comments: Use to vape    Social History     Substance and Sexual Activity   Alcohol Use Yes    Comment: social       Review of Systems   Constitutional: Negative.  Negative for  "chills and fever.   Respiratory: Negative.    Cardiovascular: Negative.    Gastrointestinal: Negative.  Negative for constipation and diarrhea.   Endocrine: Negative.    Genitourinary: Negative.  Negative for menstrual problem and vaginal discharge.   Skin: Negative.    Neurological: Negative for dizziness, syncope, light-headedness and headaches.   Psychiatric/Behavioral: Negative for suicidal ideas. The patient is not nervous/anxious.          Objective   /70   Pulse 80   Ht 165.1 cm (65\")   Wt 58.5 kg (129 lb)   LMP 10/26/2022 (Approximate)   Breastfeeding No   BMI 21.47 kg/m²     General:  well developed; well nourished  no acute distress   Skin:  No suspicious lesions seen   Thyroid: normal to inspection and palpation   Breasts:  Examined in supine position  Symmetric without masses or skin dimpling  Nipples normal without inversion, lesions or discharge  There are no palpable axillary nodes   Abdomen: soft, non-tender; no masses  no umbilical or inguinal hernias are present  no hepato-splenomegaly  Normal findings: bowel sounds normal   Cardiac: Heart sounds are normal.  Regular rate and rhythm without murmur, gallop or rub.   Resp: Normal expansion.  Clear to auscultation.  No rales, rhonchi, or wheezing.   Psych: alert,oriented, in NAD with a full range of affect, normal behavior and no psychotic features   Pelvis: Uterus:  Clinical staff was present for exam  External genitalia:  normal appearance of the external genitalia including Bartholin's and [place]'s glands.  :  urethral meatus normal;  Vaginal:  normal pink mucosa without prolapse or lesions  Cervix:  normal appearance.  Uterus:  normal size, shape and consistency  Adnexa:  normal bimanual exam of the adnexa.     Lab Review   No data reviewed    Imaging  No data reviewed       Diagnoses and all orders for this visit:    1. Encounter for gynecological examination with Papanicolaou smear of cervix (Primary)  -     LIQUID-BASED PAP SMEAR, " [facility] ([facility])    2. Routine screening for STI (sexually transmitted infection)  -     LIQUID-BASED PAP SMEAR, [facility] ([facility])    3. Oral contraceptive pill surveillance  -     norethindrone-ethinyl estradiol-iron (ESTROSTEP FE) 1-20/1-30/1-35 MG-MCG tablet; Take 1 tablet by mouth Daily.  Dispense: 28 tablet; Refill: 12    Pap results: I will send card in mail or call if abnormal. RTC annually for WWE or sooner PRN.     SBE encouraged monthly.     Uses OCP for contraceptive but has been without x 1 month. Restart the Sunday after her next period. Use condoms x1 month until fully effective. Refilled x 1 year.     G/C/T on thin prep based on age/risk guidelines.    Denies any other concerns or questions at this time.     This note was electronically signed.    Amarilis Shipman, APRN  November 22, 2022

## 2022-11-28 LAB — REF LAB TEST METHOD: NORMAL

## 2023-02-06 RX ORDER — LANSOPRAZOLE 30 MG/1
30 CAPSULE, DELAYED RELEASE ORAL DAILY
Qty: 30 CAPSULE | Refills: 5 | Status: SHIPPED | OUTPATIENT
Start: 2023-02-06

## 2023-08-02 RX ORDER — LANSOPRAZOLE 30 MG/1
30 CAPSULE, DELAYED RELEASE ORAL DAILY
Qty: 30 CAPSULE | Refills: 5 | Status: SHIPPED | OUTPATIENT
Start: 2023-08-02

## 2023-12-08 ENCOUNTER — TELEPHONE (OUTPATIENT)
Dept: GASTROENTEROLOGY | Facility: CLINIC | Age: 23
End: 2023-12-08

## 2023-12-08 RX ORDER — LANSOPRAZOLE 30 MG/1
30 CAPSULE, DELAYED RELEASE ORAL DAILY
Qty: 30 CAPSULE | Refills: 5 | OUTPATIENT
Start: 2023-12-08

## 2023-12-08 NOTE — TELEPHONE ENCOUNTER
Called pt and advised pt that since she has not been seen since 05/2022 she needs to be seen.   Pt currently living in [place]. Advised pt she can reach out to pcp and she can also get lansoprazole otc to help her get by. Pt verb understanding.

## 2023-12-08 NOTE — TELEPHONE ENCOUNTER
Caller: Sandy Davidson    Relationship: Self    Best call back number: [phone]    Requested Prescriptions:   LANSAOPRAZOLE     Pharmacy where request should be sent:  [facility] [place] - [phone]    Last office visit with prescribing clinician: 2/10/2022   Last telemedicine visit with prescribing clinician: Visit date not found   Next office visit with prescribing clinician: Visit date not found     Additional details provided by patient: PT HAS 1 PILL LEFT     Does the patient have less than a 3 day supply:  [x] Yes  [] No    Would you like a call back once the refill request has been completed: [x] Yes [] No    If the office needs to give you a call back, can they leave a voicemail: [x] Yes [] No    Sophie Shaver Rep   12/08/23 14:48 EST

## (undated) DEVICE — ADAPT CLN BIOGUARD AIR/H2O DISP

## (undated) DEVICE — TUBING, SUCTION, 1/4" X 10', STRAIGHT: Brand: MEDLINE

## (undated) DEVICE — BITEBLOCK OMNI BLOC

## (undated) DEVICE — SENSR O2 OXIMAX FNGR A/ 18IN NONSTR

## (undated) DEVICE — KT ORCA ORCAPOD DISP STRL

## (undated) DEVICE — FRCP BX RADJAW4 NDL 2.8 240CM LG OG BX40

## (undated) DEVICE — CANN O2 ETCO2 FITS ALL CONN CO2 SMPL A/ 7IN DISP LF

## (undated) DEVICE — LN SMPL CO2 SHTRM SD STREAM W/M LUER